# Patient Record
Sex: MALE | URBAN - METROPOLITAN AREA
[De-identification: names, ages, dates, MRNs, and addresses within clinical notes are randomized per-mention and may not be internally consistent; named-entity substitution may affect disease eponyms.]

---

## 2020-08-13 ENCOUNTER — IMPORTED ENCOUNTER (OUTPATIENT)
Dept: URBAN - METROPOLITAN AREA CLINIC 31 | Facility: CLINIC | Age: 65
End: 2020-08-13

## 2020-08-13 PROBLEM — H40.013: Noted: 2020-08-13

## 2020-08-13 PROBLEM — H43.393: Noted: 2020-08-13

## 2020-08-13 PROBLEM — H25.13: Noted: 2020-08-13

## 2020-08-13 PROBLEM — E11.9: Noted: 2020-08-13

## 2020-08-13 PROCEDURE — 92015 DETERMINE REFRACTIVE STATE: CPT

## 2020-08-13 PROCEDURE — 92004 COMPRE OPH EXAM NEW PT 1/>: CPT

## 2020-08-13 PROCEDURE — 92250 FUNDUS PHOTOGRAPHY W/I&R: CPT

## 2020-08-13 NOTE — PATIENT DISCUSSION
1. DM II --NO BDR OU:  --BS high 8.8. Is usually 6.7-7.2. Pt gelyats loves [place]. Discussed the pathophysiology of diabetes and its effect on the eye. Stressed the importance of regular followup and good control of BS BP and Lipids to avoid future complications. 2. 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  scheudle VF or OCT once receives records. No signs of glaucoma starting based on todays examination and testing. Will continue to monitor for glaucoma development. 3. Nuclear Sclerotic Cataract OU: Explained how cataracts can effect vision. Recommend clinical observation. The patient was advised to contact us if any change or worsening of vision. 4. Floaters OU:  Patient was cautioned to call our office immediately if they experience a substantial change in their symptoms such as an increase in floaters persistent flashes loss of visual field (may appear as a shadow or a curtain) or decrease in visual acuity as these may indicate a retinal tear or detachment. 5.   NO rx changes. --wants new lenses in his suns scratched6. RTN 1 mth VF/OCT nerve--get records from Dr Taqueria Blanco--call pt fro appt7. RTN 1 yr CE/OCT Nerve--Pt usually is in  [place] for summer.

## 2021-06-10 NOTE — PATIENT DISCUSSION
The patient is 2.5 month status post cataract surgery. The eye has healed well with no signs of infection or inflammation. All surgery associated drops may be stopped. A glasses prescription, if needed, was prescribed.

## 2022-04-01 ASSESSMENT — TONOMETRY
OD_IOP_MMHG: 16
OS_IOP_MMHG: 15

## 2022-04-01 ASSESSMENT — VISUAL ACUITY
OD_CC: 20/80
OS_SC: J10
OS_CC: 20/40-1
OD_SC: 20/400